# Patient Record
Sex: FEMALE | URBAN - METROPOLITAN AREA
[De-identification: names, ages, dates, MRNs, and addresses within clinical notes are randomized per-mention and may not be internally consistent; named-entity substitution may affect disease eponyms.]

---
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
## 2021-08-29 PROCEDURE — PSEU10635 2019 NOVEL CORONAVIRUS (SARS-COV-2): Performed by: CLINICAL MEDICAL LABORATORY

## 2021-08-29 PROCEDURE — U0005 INFEC AGEN DETEC AMPLI PROBE: HCPCS | Performed by: CLINICAL MEDICAL LABORATORY

## 2021-08-29 PROCEDURE — U0003 INFECTIOUS AGENT DETECTION BY NUCLEIC ACID (DNA OR RNA); SEVERE ACUTE RESPIRATORY SYNDROME CORONAVIRUS 2 (SARS-COV-2) (CORONAVIRUS DISEASE [COVID-19]), AMPLIFIED PROBE TECHNIQUE, MAKING USE OF HIGH THROUGHPUT TECHNOLOGIES AS DESCRIBED BY CMS-2020-01-R: HCPCS | Performed by: CLINICAL MEDICAL LABORATORY

## 2021-08-30 ENCOUNTER — LAB REQUISITION (OUTPATIENT)
Dept: LAB | Age: 21
End: 2021-08-30

## 2021-08-30 DIAGNOSIS — J06.9 ACUTE UPPER RESPIRATORY INFECTION, UNSPECIFIED: ICD-10-CM

## 2021-08-30 LAB
SARS-COV-2 RNA RESP QL NAA+PROBE: NOT DETECTED
SERVICE CMNT-IMP: NORMAL
SERVICE CMNT-IMP: NORMAL

## 2024-03-29 ENCOUNTER — APPOINTMENT (OUTPATIENT)
Dept: OPHTHALMOLOGY | Age: 24
End: 2024-03-29

## 2024-03-29 DIAGNOSIS — H52.13 MYOPIA OF BOTH EYES: Primary | ICD-10-CM

## 2024-03-29 PROCEDURE — 92015 DETERMINE REFRACTIVE STATE: CPT

## 2024-03-29 PROCEDURE — 92004 COMPRE OPH EXAM NEW PT 1/>: CPT

## 2024-03-29 PROCEDURE — 92310 CONTACT LENS FITTING OU: CPT

## 2024-03-29 ASSESSMENT — CONF VISUAL FIELD
OS_SUPERIOR_NASAL_RESTRICTION: 0
OD_SUPERIOR_NASAL_RESTRICTION: 0
OD_SUPERIOR_TEMPORAL_RESTRICTION: 0
OS_INFERIOR_TEMPORAL_RESTRICTION: 0
OD_NORMAL: 1
OD_INFERIOR_TEMPORAL_RESTRICTION: 0
OS_INFERIOR_NASAL_RESTRICTION: 0
OS_NORMAL: 1
OS_SUPERIOR_TEMPORAL_RESTRICTION: 0
OD_INFERIOR_NASAL_RESTRICTION: 0

## 2024-03-29 ASSESSMENT — VISUAL ACUITY
OS_SC: 20/400
CORRECTION_TYPE: GLASSES
OS_CC: 20/25
OS_CC: 20/20-1
OD_CC: 20/20
OD_SC: 20/400
OD_CC: 20/20-1
METHOD: SNELLEN- LINEAR

## 2024-03-29 ASSESSMENT — REFRACTION_MANIFEST
OD_AXIS: 100
METHOD_AUTOREFRACTION: 1
OD_SPHERE: -2.75
OS_SPHERE: -2.00
OD_CYLINDER: -0.25

## 2024-03-29 ASSESSMENT — REFRACTION_CURRENTRX
OS_SPHERE: -2.00
OD_DIAMETER: 14.0
OD_SPHERE: -2.75
OS_BASECURVE: 8.4
OS_DIAMETER: 14.0
OD_BRAND: ACUVUE OASYS
OD_BASECURVE: 8.4
OS_BRAND: ACUVUE OASYS

## 2024-03-29 ASSESSMENT — REFRACTION
OD_SPHERE: -2.75
OD_AXIS: 099
OS_SPHERE: -2.00
OD_CYLINDER: -0.50
OS_CYLINDER: SPHERE

## 2024-03-29 ASSESSMENT — REFRACTION_WEARINGRX
OS_SPHERE: -2.00
OD_SPHERE: -2.75
OS_AXIS: 029
OS_CYLINDER: -0.25

## 2024-03-29 ASSESSMENT — KERATOMETRY
OD_AXISANGLE2_DEGREES: 180
OS_AXISANGLE_DEGREES: 100
OD_K1POWER_DIOPTERS: 42.75
OS_K2POWER_DIOPTERS: 44.00
OD_AXISANGLE_DEGREES: 90
OS_AXISANGLE2_DEGREES: 10
OS_K1POWER_DIOPTERS: 43.00
METHOD_AUTO_MANUAL: AUTOMATED
OD_K2POWER_DIOPTERS: 43.50

## 2024-03-29 ASSESSMENT — TONOMETRY
OS_IOP_MMHG: 16
OD_IOP_MMHG: 14
IOP_METHOD: PERKINS TONOMETER

## 2024-03-29 ASSESSMENT — SLIT LAMP EXAM - LIDS
COMMENTS: NORMAL
COMMENTS: NORMAL

## 2024-03-29 ASSESSMENT — EXTERNAL EXAM - RIGHT EYE: OD_EXAM: NORMAL

## 2024-03-29 ASSESSMENT — CUP TO DISC RATIO
OS_RATIO: 0.3
OD_RATIO: 0.3

## 2024-03-29 ASSESSMENT — EXTERNAL EXAM - LEFT EYE: OS_EXAM: NORMAL

## 2024-04-12 ENCOUNTER — NURSE ONLY (OUTPATIENT)
Dept: OPHTHALMOLOGY | Age: 24
End: 2024-04-12

## 2024-04-12 DIAGNOSIS — H52.13 MYOPIA OF BOTH EYES: Primary | ICD-10-CM

## 2025-04-04 ENCOUNTER — APPOINTMENT (OUTPATIENT)
Dept: OPHTHALMOLOGY | Age: 25
End: 2025-04-04